# Patient Record
(demographics unavailable — no encounter records)

---

## 2025-05-16 NOTE — BIRTH HISTORY
[Duration: ___ wks] : duration: [unfilled] weeks [Vaginal] : Vaginal [___ lbs.] : [unfilled] lbs Additional Notes: Patient was counseled on excision removal procedure, which can be considered at surgical followup. Additional Notes: DDX: Pilar vs Epidermal Inclusion Cyst Render Risk Assessment In Note?: no Additional Notes: Informed pt likely benign but can biopsy to make sure. Pt wanted biopsy. Large size and presence of red vessels on dermoscopy warranted biopsy. No FH of personal Hx of skin cancer. Detail Level: Simple

## 2025-05-19 NOTE — DATA REVIEWED
[de-identified] : Right knee/distal femur AP/lateral/oblique x-rays ordered, done and independently reviewed today on 5/16/25: Positive osteochondroma noted over the medial cortex of the distal femur metaphysis.  XR right knee and femur from [facility] radiology reviewed demonstrating Ossific exostosis extending from the medial cortex of the distal femoral metaphysis oriented away from the joint, measuring 2.9 cm at the base and 5.2 cm in length, compatible with an osteochondroma.

## 2025-05-19 NOTE — DATA REVIEWED
[de-identified] : Right knee/distal femur AP/lateral/oblique x-rays ordered, done and independently reviewed today on 5/16/25: Positive osteochondroma noted over the medial cortex of the distal femur metaphysis.  XR right knee and femur from [facility] radiology reviewed demonstrating Ossific exostosis extending from the medial cortex of the distal femoral metaphysis oriented away from the joint, measuring 2.9 cm at the base and 5.2 cm in length, compatible with an osteochondroma.

## 2025-05-19 NOTE — REASON FOR VISIT
[Initial Evaluation] : an initial evaluation [Family Member] : family member [Patient] : patient [Mother] : mother [FreeTextEntry1] : mass right knee osteochondroma

## 2025-05-19 NOTE — PHYSICAL EXAM
[FreeTextEntry1] : Gait: Presents ambulating independently without signs of antalgia.  Good coordination and balance noted. GENERAL: alert, cooperative, in NAD SKIN: The skin is intact, warm, pink and dry over the area examined. EYES: Normal conjunctiva, normal eyelids and pupils were equal and round. ENT: normal ears, normal nose and normal lips. CARDIOVASCULAR: brisk capillary refill, but no peripheral edema. RESPIRATORY: The patient is in no apparent respiratory distress. They're taking full deep breaths without use of accessory muscles or evidence of audible wheezes or stridor without the use of a stethoscope. Normal respiratory effort. ABDOMEN: not examined  focused exam right knee No signs of trauma, or erythema noted. No visible effusion, muscle atrophy or asymmetry.  There is palpable mass distal femur on the medial aspect with tenderness  No joint line, MCL, LCL,patellar tendon, or quadriceps tendon tenderness.  Full active and passive range of motion of the knee. Toes are warm, pink, and moving freely.  Strength is 5/5. Sensation is intact to light touch distally. Patellar reflex +2 B/L. Brisk capillary refill in all toes. No joint laxity palpable. Joint is stable with varus and valgus stress. Negative Lachman test, negative anterior and posterior drawer with solid end point.  Negative Lauryn test. Negative patellar grind and patellar apprehension test.  No abnormal findings on ankle or hip examination. No effusion of R knee  Spine: Positive postural kyphosis which corrects with back extension.  There is currently no discomfort with palpation or range of motion of the paraspinal muscles or spinous processes.

## 2025-05-19 NOTE — HISTORY OF PRESENT ILLNESS
[FreeTextEntry1] : Jayla is a 10Y female who presents with her mother and older sister for evaluation of right knee pain. Per sister, the patient has been complaining of right knee pain for the past 1 month. Denies any injury, trauma or fall when the symptoms began. She was initially evaluated by her pediatrician who recommended XRs right knee. The XRs were performed at [facility] which demonstrated an osteochondroma. She was referred here for orthopedic evaluation. The pain is localized to the distal femur medially and is intermittent.  She also has occasional bouts of on and off back discomfort currently asymptomatic. She denies any radiating pain, numbness or any tingling sensation. Here for orthopedic evaluation and management. Please refer to last note from previous treatment and further details.   Today, Jayla is an 11-year-old girl who has a history of a right knee distal femur osteochondroma.  She states she feels fine with no discomfort.  She has completed physical therapy which relieved her discomfort.  She states she just does not like the way it is feeling at times.  She would like to go back to activities.  She presents today for repeat exam and x-rays  The patient's HPI was reviewed thoroughly with patient and parent. The patient's parent has acted as an independent historian regarding the above information due to the unreliable nature of the history obtained from the patient.

## 2025-05-19 NOTE — END OF VISIT
[FreeTextEntry3] :     Saw and examined patient; the above is an accurate documentation of my words and actions.   Cielo Mckinney MD [facility] Pediatric Orthopedic Surgery    [Time Spent: ___ minutes] : I have spent [unfilled] minutes of time on the encounter which excludes teaching and separately reported services.

## 2025-05-19 NOTE — END OF VISIT
[FreeTextEntry3] :     Saw and examined patient; the above is an accurate documentation of my words and actions.   Cielo Mckinney MD [facility] Pediatric Orthopedic Surgery    [Time Spent: ___ minutes] : I have spent [unfilled] minutes of time on the encounter which excludes teaching and separately reported services.

## 2025-05-19 NOTE — ASSESSMENT
[FreeTextEntry1] : Jayla is a 11Y female with osteochondroma right distal femur with on/off back pain. Today's assessment was performed with the assistance of the patient's parent as an independent historian as the patient's history is unreliable. The radiographs obtained today were reviewed with both the parent and patient confirming a right distal femoral osteochondroma.  We did discuss the potential for surgical removal however there is a possibility of this growing back.  The recommendation at this time will consist of obtaining a MRI of the right knee/distal femur to evaluate the osteochondroma.  We did provide her with a prescription for physical therapy for both her right knee discomfort and back pain.  She will follow-up once the MRI is completed to discuss results and further treatment plan.  We had a thorough talk in regard to the diagnosis, prognosis and treatment modalities.  All questions and concerns were addressed today. There was a verbal understanding from the parents and patient.   ARGELIA Hill have acted as a scribe and documented the above information for Dr. Mckinney   This note was generated using Dragon medical dictation software. A reasonable effort has been made for proofreading its contents; however, typos may still remain. If there are any questions or points of clarification needed, please do not hesitate to contact my office.   The above documentation completed by the scribe is an accurate record of both my words and actions.   Dr. Mckinney

## 2025-05-19 NOTE — HISTORY OF PRESENT ILLNESS
[FreeTextEntry1] : Jayla is a 10Y female who presents with her mother and older sister for evaluation of right knee pain. Per sister, the patient has been complaining of right knee pain for the past 1 month. Denies any injury, trauma or fall when the symptoms began. She was initially evaluated by her pediatrician who recommended XRs right knee. The XRs were performed at [facility] which demonstrated an osteochondroma. She was referred here for orthopedic evaluation. The pain is localized to the distal femur medially and is intermittent.  She also has occasional bouts of on and off back discomfort currently asymptomatic. She denies any radiating pain, numbness or any tingling sensation. Here for orthopedic evaluation and management. Please refer to last note from previous treatment and further details.   Today, Jayla is an 11-year-old girl who has a history of a right knee distal femur osteochondroma.  She states she feels fine with no discomfort.  She has completed physical therapy which relieved her discomfort.  She states she just does not like the way it is feeling at times.  She would like to go back to activities.  She presents today for repeat exam and x-rays  The patient's HPI was reviewed thoroughly with patient and parent. The patient's parent has acted as an independent historian regarding the above information due to the unreliable nature of the history obtained from the patient.